# Patient Record
Sex: MALE | Race: WHITE | NOT HISPANIC OR LATINO | Employment: FULL TIME | URBAN - METROPOLITAN AREA
[De-identification: names, ages, dates, MRNs, and addresses within clinical notes are randomized per-mention and may not be internally consistent; named-entity substitution may affect disease eponyms.]

---

## 2017-12-12 ENCOUNTER — TRANSFERRED RECORDS (OUTPATIENT)
Dept: HEALTH INFORMATION MANAGEMENT | Facility: CLINIC | Age: 62
End: 2017-12-12

## 2017-12-13 ENCOUNTER — TRANSFERRED RECORDS (OUTPATIENT)
Dept: HEALTH INFORMATION MANAGEMENT | Facility: CLINIC | Age: 62
End: 2017-12-13

## 2020-04-04 ENCOUNTER — VIRTUAL VISIT (OUTPATIENT)
Dept: FAMILY MEDICINE | Facility: OTHER | Age: 65
End: 2020-04-04

## 2020-04-04 NOTE — PROGRESS NOTES
"Date: 2020 10:42:36  Clinician: Lilly Luna  Clinician NPI: [id number]  Patient: Elton Serra  Patient : 1955  Patient Address: [address] [place] [address]  Patient Phone: [phone]  Visit Protocol: URI  Patient Summary:  Elton is a 65 year old ( : 1955 ) male who initiated a Visit for COVID-19 (Coronavirus) evaluation and screening. When asked the question \"Please sign me up to receive news, health information and promotions from [employer].\", Elton responded \"No\".    Elton states his symptoms started suddenly 7-9 days ago. After his symptoms started, they improved and then got worse again.   His symptoms consist of rhinitis, a sore throat, a cough, nasal congestion, malaise, and wheezing.   Symptom details     Nasal secretions: The color of his mucus is clear.    Cough: Elton coughs a few times an hour and his cough is not more bothersome at night. Phlegm comes into his throat when he coughs. He believes his cough is caused by post-nasal drip. The color of the phlegm is clear.     Sore throat: Elton reports having mild throat pain (1-3 on a 10 point pain scale), does not have exudate on his tonsils, and can swallow liquids. He is not sure if the lymph nodes in his neck are enlarged. A rash has not appeared on the skin since the sore throat started.     Wheezing: Elton has not ever been diagnosed with asthma. The wheezing does not interfere with his normal daily activities.     Elton denies having facial pain or pressure, myalgias, ear pain, chills, diarrhea, vomiting, nausea, teeth pain, headache, and fever. He also denies taking antibiotic medication for the symptoms and having recent facial or sinus surgery in the past 60 days. He is not experiencing dyspnea.   Precipitating events  Within the past week, Elton has not been exposed to someone with strep throat. He has recently been exposed to someone with influenza. Elton has been in close contact with the following high risk individuals: " adults 65 or older.   Pertinent COVID-19 (Coronavirus) information  Elton has not traveled internationally or to the areas where COVID-19 (Coronavirus) is widespread, including cruise ship travel in the last 14 days before the start of his symptoms.   Elton has not had a close contact with a laboratory-confirmed COVID-19 patient within 14 days of symptom onset. He also has not had a close contact with a suspected COVID-19 patient within 14 days of symptom onset.   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 He provides direct patient care. He lives with a healthcare worker.   Pertinent medical history  Elton does not need a return to work/school note.   Weight: 220 lbs   Elton does not smoke or use smokeless tobacco.   Additional information as reported by the patient (free text): I have isolated myself at home for 7 days. Still feel ill, flu-like, and sore throat started last night. No fever any of the time   Weight: 220 lbs    MEDICATIONS: No current medications, ALLERGIES: NKDA  Clinician Response:  Dear Elton,   Based on the information you have provided, you do have symptoms that are consistent with Coronavirus (COVID-19).   The coronavirus causes mild to severe respiratory illness with the most common symptoms including fever, cough and difficulty breathing. Unfortunately, many viruses cause similar symptoms and it can be difficult to distinguish between viruses, especially in mild cases, so we are presuming that anyone with cough or fever has coronavirus at this time.  Coronavirus/COVID-19 has reached the point of community spread in [place], meaning that we are finding the virus in people with no known exposure risk for nader the virus. Given the increasing commonness of coronavirus in the community we are no longer testing patients who are not critically ill.  If you are a health care worker, you should refer to your  employee health office for instructions about testing and returning to work.  For everyone else who has cough or fever, you should assume you are infected with coronavirus. Since you will not be tested but have symptoms that may be consistent with coronavirus, the CDC recommends you stay in self-isolation until these three things have happened:    You have had no fever for at least 72 hours (that is three full days of no fever without the use of medicine that reduces fevers)    AND   Other symptoms have improved (for example, when your cough or shortness of breath have improved)   AND   At least 7 days have passed since your symptoms first appeared.   How to Isolate:    Isolate yourself at home.   Do Not allow any visitors  Do Not go to work or school  Do Not go to [employer],  centers, shopping, or other public places.  Do Not shake hands.  Avoid close contact with others (hugging, kissing).   Protect Others:    Cover Your Mouth and Nose with a mask, disposable tissue or wash cloth to avoid spreading germs to others.  Wash your hands and face frequently with soap and water.   Managing Symptoms:    At this time, we primarily recommend Tylenol (Acetaminophen) for fever or pain. If you have liver or kidney problems, contact your primary care provider for instructions on use of tylenol. Adults can take 650 mg (two 325 mg pills) by mouth every 4-6 hours as needed OR 1,000 mg (two 500 mg pills) every 8 hours as needed. MAXIMUM DAILY DOSE: 3,000mg. For children, refer to dosing on bottle based on age or weight.   If you develop significant shortness of breath that prevents you from doing normal activities, please call 911 or proceed to the nearest emergency room and alert them immediately that you have been in self-isolation for possible coronavirus.   If you have a higher risk medical condition such as cancer, heart failure, end stage renal disease on dialysis or have a transplant, please reach out to your  specialist's clinic to advise them of your [facility] visit should you not improve within the next two days.  For more information about COVID19 and options for caring for yourself at home, please visit the CDC website at https://www.cdc.gov/coronavirus/2019-ncov/about/steps-when-sick.htmlFor more options for care at [facility], please visit our website at [email or web address]     Diagnosis: Cough  Diagnosis ICD: R05

## 2020-04-14 ENCOUNTER — VIRTUAL VISIT (OUTPATIENT)
Dept: INTERNAL MEDICINE | Facility: CLINIC | Age: 65
End: 2020-04-14
Payer: COMMERCIAL

## 2020-04-14 DIAGNOSIS — R53.83 OTHER FATIGUE: ICD-10-CM

## 2020-04-14 DIAGNOSIS — A08.4 VIRAL GASTROENTERITIS: Primary | ICD-10-CM

## 2020-04-14 DIAGNOSIS — R05.3 CHRONIC COUGH: ICD-10-CM

## 2020-04-14 RX ORDER — ACETAMINOPHEN 500 MG
1000 TABLET ORAL
COMMUNITY

## 2020-04-14 ASSESSMENT — PAIN SCALES - GENERAL: PAINLEVEL: MILD PAIN (2)

## 2020-04-14 NOTE — NURSING NOTE
Chief Complaint   Patient presents with     Diarrhea     pt state he is having diarrhea, stomach pain       Saima Arias CMA at 8:02 AM on 4/14/2020.

## 2020-04-14 NOTE — PROGRESS NOTES
"Elton Serra is a 65 year old male who is being evaluated via a billable telephone visit.      The patient has been notified of following:     \"This telephone visit will be conducted via a call between you and your physician/provider. We have found that certain health care needs can be provided without the need for a physical exam.  This service lets us provide the care you need with a short phone conversation.  If a prescription is necessary we can send it directly to your pharmacy.  If lab work is needed we can place an order for that and you can then stop by our lab to have the test done at a later time.    Telephone visits are billed at different rates depending on your insurance coverage. During this emergency period, for some insurers they may be billed the same as an in-person visit.  Please reach out to your insurance provider with any questions.    If during the course of the call the physician/provider feels a telephone visit is not appropriate, you will not be charged for this service.\"    Patient has given verbal consent for Telephone visit?  Yes    How would you like to obtain your AVS? [employer]    Subjective     Elton Serra is a 65 year old male who called clinic today to discuss recent illness and inquire about his need to continue to quarantine.  He felt ill about 2-1/2 weeks ago, initial symptom was primarily fatigue and feeling wiped out.  He feels like it is sometimes difficult for him to focus any feels \"spacey.  \"He has had some tension in his muscles but denies true muscle aches.  He has had a chronic cough for the past 6 months although overall feels this is getting better.  He produces a small amount of white sputum occasionally with this cough.  His cough has not worsened over the last 2-1/2 weeks.  He does feel tired to the point he cannot walk his dog more than a block.  However he denies shortness of breath.  He has not had any fevers, checked yesterday and temperature was 98.6.    He " did call in on April 4 to inquire about COVID testing.  It was recommended that he quarantine and not present for testing.  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  He has been home since that time, went out grocery shopping on several occasions but has not left the home since becoming ill 2-1/2 weeks ago.  His wife has not been sick.  He did have influenza about 4 months ago.  He also was visiting his mother in the hospital sometime in February.  He does not have any known COVID exposures.    Over the past 4 days he has developed some diarrhea, currently having 2-3 watery bowel movements per day often after eating.  He is able to keep fluids and food down without nausea or vomiting.  He is not having much abdominal pain.  Has not noticed bloody stools.  Continues to be afebrile.  Overall feels like things may be getting a little bit better but was concerned about the duration of his illness.  Him and his wife are hopeful he can stop quarantine soon as it is disruptive, they are currently staying in separate rooms.      Patient Active Problem List   Diagnosis     Brachial neuritis or radiculitis     Dyspnea     Episodic recurrent vertigo     Past Surgical History:   Procedure Laterality Date     APPENDECTOMY       HERNIA REPAIR         Social History     Tobacco Use     Smoking status: Never Smoker     Smokeless tobacco: Never Used   Substance Use Topics     Alcohol use: No     Family History   Problem Relation Age of Onset     Diabetes Mother         on insulin     Diabetes Father      Diabetes Brother      Diabetes Maternal Grandmother      Diabetes Maternal Grandfather      Diabetes Paternal Grandmother      Diabetes Paternal Grandfather          Current Outpatient Medications   Medication Sig Dispense Refill     acetaminophen (TYLENOL) 500 MG tablet Take 1,000 mg by mouth       Blood Pressure Monitoring (BLOOD PRESSURE CUFF) MISC Measure blood pressure in the sitting  position, after resting for at least 5 minutes. 1 Device 1     Multiple Vitamins-Minerals (MULTIVITAMIN ADULTS PO) Take 1 tablet by mouth       UNABLE TO FIND MEDICATION NAME: chinese herbs       Allergies   Allergen Reactions     Contrast Dye      6/14 - reaction after MRI     Percocet [Oxycodone-Acetaminophen] Other (See Comments)     nightmares     Gadavist [Gadobutrol] Hives     Patient received 10ml IV gadavist on 6/14/14 at [facility].   After completion of scan patient had many large hives, became hypotensive and vasovagal.   Patient was sent to ER for treatment.     BP Readings from Last 3 Encounters:   11/03/15 146/90   07/06/15 123/83   06/22/15 123/81    Wt Readings from Last 3 Encounters:   11/03/15 101.1 kg (222 lb 12.8 oz)   07/06/15 98 kg (216 lb)   06/22/15 99 kg (218 lb 3.2 oz)         Reviewed and updated as needed this visit by Provider         Review of Systems   ROS COMP: 10 point ROS negative except as stated in the HPI       Objective   Reported vitals:  There were no vitals taken for this visit.   healthy, alert and no distress  PSYCH: Alert and oriented times 3; coherent speech, normal   rate and volume, able to articulate logical thoughts, able   to abstract reason, no tangential thoughts, no hallucinations   or delusions  His affect is normal and pleasant  RESP: No cough, able to talk in full sentences on phone interview  Remainder of exam unable to be completed due to telephone visits        Assessment/Plan:  1. Viral gastroenteritis  2. Other fatigue  3. Chronic cough  I spoke with Elton regarding a prolonged, seemingly viral illness for the past 2.5 weeks. His primary symptoms include fatigue, generalized weakness, muscle tension with new onset diarrhea over the past several days. He has not been febrile for the duration of his illness. He has been quarantining himself, has not left his home and is living in a separate room from his wife. One of his main concerns is whether/when he can stop  "self quarantine. He is certainly concerned about COVID infection.  We discussed the variable presentation COVID ranging from asymptomatic to severe respiratory failure.  Of the approximately 80% \"mild \"cases symptoms range from asymptomatic to viral pneumonia that does not require hospitalization, the course of the illness often lasts at least two weeks. I suspect he likely does have a viral illness which certainly includes COVID in the differential diagnosis.  However he does not have prominent respiratory symptoms and has been afebrile, so I do not feel that there is any need to pursue testing which would necessitate he present to the ED.  I do not recommend he present to the emergency room.  Gastroenteritis has been described in COVID patient's and evidence of viral infection is present in the stool although it is not clear if that is infectious.  Based on this I recommend we follow the CDC guidelines for self quarantine, I recommended that he quarantine for at least 7 days from the onset of his new symptoms (diarrhea) and has been afebrile for at least 72 hours without the use of antipyretics.  Thus he will continue to self quarantine for now although if his diarrhea is clearly resolving by the weekend he can consider removing himself from isolation at that time.  We discussed red flag symptoms which would necessitate evaluation in the emergency room including shortness of breath to the point he cannot carry on conversation, severe shortness of breath particularly at night or inability to tolerate fluids/food. He can call into resident clinic on Thursday if he does not continue to improve.        Phone call duration:  30 minutes, 8:35 a.m. to 9:05 a.m.    Regis Perry MD  [phone]  Teaching Physician Note:    I, Dr. Velasquez, was with the resident on the phone visit for the critical and key portions on the visit. I agree with the resident's findings and plan of care as documented in the resident's " note.     Sadie Velasquez M.D.  Internal Medicine  [facility]   pager [phone]

## 2020-11-01 ENCOUNTER — TRANSFERRED RECORDS (OUTPATIENT)
Dept: HEALTH INFORMATION MANAGEMENT | Facility: CLINIC | Age: 65
End: 2020-11-01